# Patient Record
Sex: MALE | Race: WHITE | HISPANIC OR LATINO | URBAN - METROPOLITAN AREA
[De-identification: names, ages, dates, MRNs, and addresses within clinical notes are randomized per-mention and may not be internally consistent; named-entity substitution may affect disease eponyms.]

---

## 2021-09-09 ENCOUNTER — EMERGENCY (EMERGENCY)
Facility: HOSPITAL | Age: 21
LOS: 1 days | Discharge: HOME | End: 2021-09-09
Attending: EMERGENCY MEDICINE | Admitting: EMERGENCY MEDICINE
Payer: MEDICARE

## 2021-09-09 VITALS
HEIGHT: 63 IN | OXYGEN SATURATION: 96 % | TEMPERATURE: 103 F | DIASTOLIC BLOOD PRESSURE: 72 MMHG | HEART RATE: 118 BPM | WEIGHT: 125 LBS | SYSTOLIC BLOOD PRESSURE: 121 MMHG | RESPIRATION RATE: 19 BRPM

## 2021-09-09 LAB
APPEARANCE UR: CLEAR — SIGNIFICANT CHANGE UP
BILIRUB UR-MCNC: NEGATIVE — SIGNIFICANT CHANGE UP
COLOR SPEC: YELLOW — SIGNIFICANT CHANGE UP
DIFF PNL FLD: ABNORMAL
GLUCOSE UR QL: NEGATIVE — SIGNIFICANT CHANGE UP
KETONES UR-MCNC: ABNORMAL
LEUKOCYTE ESTERASE UR-ACNC: NEGATIVE — SIGNIFICANT CHANGE UP
NITRITE UR-MCNC: NEGATIVE — SIGNIFICANT CHANGE UP
PH UR: 7 — SIGNIFICANT CHANGE UP (ref 5–8)
PROT UR-MCNC: NEGATIVE — SIGNIFICANT CHANGE UP
SP GR SPEC: 1.01 — SIGNIFICANT CHANGE UP (ref 1.01–1.02)
UROBILINOGEN FLD QL: NEGATIVE — SIGNIFICANT CHANGE UP

## 2021-09-09 PROCEDURE — 0225U NFCT DS DNA&RNA 21 SARSCOV2: CPT

## 2021-09-09 PROCEDURE — 81001 URINALYSIS AUTO W/SCOPE: CPT

## 2021-09-09 PROCEDURE — 93005 ELECTROCARDIOGRAM TRACING: CPT

## 2021-09-09 PROCEDURE — 87086 URINE CULTURE/COLONY COUNT: CPT

## 2021-09-09 PROCEDURE — 93010 ELECTROCARDIOGRAM REPORT: CPT

## 2021-09-09 PROCEDURE — 99284 EMERGENCY DEPT VISIT MOD MDM: CPT

## 2021-09-09 PROCEDURE — 99053 MED SERV 10PM-8AM 24 HR FAC: CPT

## 2021-09-09 RX ORDER — ACETAMINOPHEN 500 MG
975 TABLET ORAL ONCE
Refills: 0 | Status: COMPLETED | OUTPATIENT
Start: 2021-09-09 | End: 2021-09-09

## 2021-09-09 RX ADMIN — Medication 975 MILLIGRAM(S): at 23:34

## 2021-09-09 NOTE — ED PROVIDER NOTE - PATIENT PORTAL LINK FT
You can access the FollowMyHealth Patient Portal offered by [facility] by registering at the following website: [email or web address]. By joining [employer]’s FollowMyHealth portal, you will also be able to view your health information using other applications (apps) compatible with our system.

## 2021-09-09 NOTE — ED PROVIDER NOTE - CARE PLAN
1 Principal Discharge DX:	Febrile illness   Principal Discharge DX:	Febrile illness  Secondary Diagnosis:	Microscopic hematuria

## 2021-09-09 NOTE — ED PROVIDER NOTE - PHYSICAL EXAMINATION
Gen:  alert, awake, no acute distress  HEENT:  atraumatic head, airway clear, pupils equal and round  CV:  rrr, nl S1, S2, no m/r/g  Pulm:  lungs CTA b/l  Abd: s/nt/nd, +BS  Ext:  moving all extremities  Neuro:  grossly intact, no focal deficits  Skin:  clear, dry, intact, warm to touch  Psych: AOx3, normal affect, no apparent risk to self or others

## 2021-09-09 NOTE — ED PROVIDER NOTE - OBJECTIVE STATEMENT
pt with 1 day of fevers, chills, sneezing, cough, body aches, had covid vaccination moderna in August.  denies any sob, had 2 episodes of diarrhea, denies abd pain, has some burning with urination.

## 2021-09-09 NOTE — ED PROVIDER NOTE - NSFOLLOWUPINSTRUCTIONS_ED_ALL_ED_FT
-- Take tylenol every 4 hours and ibuprofen every 6 hours as needed for fever and body aches.    -- You have microscopic blood in your urine. Follow up with urology referral.    -- Return to the ER for worsening or persistent symptoms, and/or ANY NEW OR CONCERNING SYMPTOMS.    -- If you have difficulty following up, please call: [phone] to obtain a [facility] doctor or specialist who takes your insurance in your area.      Hematuria    LO QUE NECESITA SABER:    La hematuria significa que hay morena en la orina. La orina podría ponerse de color pagan brillante a café obscuro.    INSTRUCCIONES SOBRE [facility]ITAL[facility]:    Regrese a la sandrine de emergencias si:  •Nota morena en la orina después de randal sufrido mo lesión nueva, efe mo caída.      •Siente un dolor eder en la espalda o el costado y no se le dorian con el tratamiento.      Llame a holt médico si:  •Orina cantidades muy pequeñas o nada en absoluto.      •Siente que no puede vaciar la vejiga.      •Holt fiebre empeora o no se sahil con el tratamiento.      •No puede retener líquidos o medicamentos en holt estómago.      •Holt orina se oscurece, aún después de randal tomado líquidos adicionales.      •Usted tiene preguntas o inquietudes acerca de holt condición, tratamiento o cuidado.      Monroe líquidos según alexandr indicaciones:Es probable que usted necesite yobany líquidos adicionales para ayudar a eliminar la morena de holt cuerpo por medio de la orina. El agua es el mejor líquido para yobany. Pregunte cuánto líquido debe yobany cada día y cuáles líquidos son los más adecuados para usted.    Acuda a la consulta de control con holt médico según las indicaciones:Anote alexandr preguntas para que se acuerde de hacerlas una alexandr visitas.

## 2021-09-09 NOTE — ED ADULT TRIAGE NOTE - CHIEF COMPLAINT QUOTE
pt came in from home with c/o fevers , chills and body aches since 1030 am. last took Motrin at 830 pm tonight. Denies any sick contacts or covid exposure.

## 2021-09-09 NOTE — ED PROVIDER NOTE - CARE PROVIDER_API CALL
Guillaume Lo  UROLOGY  [address] [place] [address], [address]  [place], [place] [id number]  Phone: [phone]  Fax: [phone]  Follow Up Time:

## 2021-09-10 VITALS
TEMPERATURE: 100 F | SYSTOLIC BLOOD PRESSURE: 119 MMHG | DIASTOLIC BLOOD PRESSURE: 88 MMHG | HEART RATE: 87 BPM | RESPIRATION RATE: 19 BRPM | OXYGEN SATURATION: 99 %

## 2021-09-10 LAB
BACTERIA # UR AUTO: NEGATIVE /HPF — SIGNIFICANT CHANGE UP
EPI CELLS # UR: SIGNIFICANT CHANGE UP
RAPID RVP RESULT: SIGNIFICANT CHANGE UP
RBC CASTS # UR COMP ASSIST: ABNORMAL /HPF (ref 0–4)
SARS-COV-2 RNA SPEC QL NAA+PROBE: SIGNIFICANT CHANGE UP
WBC UR QL: NEGATIVE /HPF — SIGNIFICANT CHANGE UP (ref 0–5)

## 2021-09-10 RX ADMIN — Medication 975 MILLIGRAM(S): at 00:05

## 2021-09-11 LAB
CULTURE RESULTS: NO GROWTH — SIGNIFICANT CHANGE UP
SPECIMEN SOURCE: SIGNIFICANT CHANGE UP

## 2021-10-14 NOTE — ED ADULT TRIAGE NOTE - ISOLATION INDICATION AIRBORNE CONTACT
What Type Of Note Output Would You Prefer (Optional)?: Standard Output Is The Patient Presenting As Previously Scheduled?: Yes How Severe Is Your Rash?: moderate Is This A New Presentation, Or A Follow-Up?: Rash Other Specify